# Patient Record
Sex: FEMALE | Race: WHITE | NOT HISPANIC OR LATINO | Employment: FULL TIME | URBAN - METROPOLITAN AREA
[De-identification: names, ages, dates, MRNs, and addresses within clinical notes are randomized per-mention and may not be internally consistent; named-entity substitution may affect disease eponyms.]

---

## 2023-02-20 LAB
CALCIDIOL (25 OH VITAMIN D3) (NG/ML) IN SER/PLAS: 56 NG/ML
CANCER AG 125 (U/ML) IN SERUM: 9.5 U/ML (ref 0–30.2)
THYROTROPIN (MIU/L) IN SER/PLAS BY DETECTION LIMIT <= 0.05 MIU/L: 1.58 MIU/L (ref 0.44–3.98)

## 2023-04-03 ENCOUNTER — TELEPHONE (OUTPATIENT)
Dept: PRIMARY CARE | Facility: CLINIC | Age: 63
End: 2023-04-03
Payer: COMMERCIAL

## 2023-08-04 DIAGNOSIS — Z13.220 LIPID SCREENING: ICD-10-CM

## 2023-08-04 RX ORDER — ATORVASTATIN CALCIUM 10 MG/1
10 TABLET, FILM COATED ORAL DAILY
COMMUNITY
Start: 2023-05-22 | End: 2023-08-04 | Stop reason: SDUPTHER

## 2023-08-04 RX ORDER — ATORVASTATIN CALCIUM 10 MG/1
10 TABLET, FILM COATED ORAL DAILY
Qty: 90 TABLET | Refills: 3 | Status: SHIPPED | OUTPATIENT
Start: 2023-08-04

## 2023-08-30 ENCOUNTER — OFFICE VISIT (OUTPATIENT)
Dept: PRIMARY CARE | Facility: CLINIC | Age: 63
End: 2023-08-30
Payer: COMMERCIAL

## 2023-08-30 VITALS
TEMPERATURE: 97.7 F | DIASTOLIC BLOOD PRESSURE: 74 MMHG | BODY MASS INDEX: 42.69 KG/M2 | WEIGHT: 272 LBS | SYSTOLIC BLOOD PRESSURE: 124 MMHG | HEIGHT: 67 IN | OXYGEN SATURATION: 98 % | HEART RATE: 78 BPM

## 2023-08-30 DIAGNOSIS — E78.5 HYPERLIPIDEMIA, UNSPECIFIED HYPERLIPIDEMIA TYPE: Primary | ICD-10-CM

## 2023-08-30 DIAGNOSIS — E03.9 HYPOTHYROIDISM, UNSPECIFIED TYPE: ICD-10-CM

## 2023-08-30 DIAGNOSIS — C56.9 MALIGNANT NEOPLASM OF OVARY, UNSPECIFIED LATERALITY (MULTI): ICD-10-CM

## 2023-08-30 PROCEDURE — 1036F TOBACCO NON-USER: CPT | Performed by: INTERNAL MEDICINE

## 2023-08-30 PROCEDURE — 99214 OFFICE O/P EST MOD 30 MIN: CPT | Performed by: INTERNAL MEDICINE

## 2023-08-30 RX ORDER — LEVOTHYROXINE SODIUM 150 UG/1
150 TABLET ORAL DAILY
COMMUNITY
End: 2024-03-21 | Stop reason: SDUPTHER

## 2023-08-30 RX ORDER — MELOXICAM 15 MG/1
15 TABLET ORAL DAILY
COMMUNITY
End: 2024-03-21 | Stop reason: SDUPTHER

## 2023-08-30 RX ORDER — CALCIUM CARBONATE 600 MG
600 TABLET ORAL DAILY
COMMUNITY

## 2023-08-30 RX ORDER — CETIRIZINE HYDROCHLORIDE 10 MG/1
5 TABLET ORAL DAILY
COMMUNITY
Start: 2015-07-30

## 2023-08-30 RX ORDER — CALCIUM CARB/VITAMIN D3/VIT K1 500-500-40
400 TABLET,CHEWABLE ORAL
COMMUNITY

## 2023-08-30 RX ORDER — UBIQUINOL 100 MG
CAPSULE ORAL
COMMUNITY
Start: 2015-07-30

## 2023-08-30 NOTE — PROGRESS NOTES
Subjective   Patient ID: Irena Long is a 63 y.o. female who presents for the following    PHYSICAL 23  Assessment/Plan   OA: patient has severe left knee OA and takes meloxicam.       Right knee OA s/p steroid injection lasted 4-5 months.   handicap place card given      hld: started on statin a few weeks ago. will repeat lipid panel and cmp in 6 months      hypothyroid: tsh wnl    Ovarian cancer: likely in remission been 20 years ago now as of 23. Will continue to screen ca-125      ekg wnl     colonoscopy 2021      mammogram order given        HPI  female with hypothyroidism, osteoarthritis of the knees, seasonal allergies, TERRI/BSO ovarian cancer, cholecystectomy, , here for a        positive cologaurd and then patient did do a colonoscopy on 2021 with polyps and was told 5 year follow up     ovarian ca  survivor: patient follows with Dr Zamarripa, she gets repeat CA-125     hypothyroid: patient denies any hypo or hypothyroid symptoms. patient denies any palpitations, diarrhea or constipation    OA right and left knee: feels cold in the knees and it huts. She says that she has difficulties walking at times. She says that steroid injections have historically helped.      social: patient denies any smoking, drug (occasional etoh use) or alcohol abuse     family history: dad passed MI     surgical history: s/p total hysterectomy      works in [id number]     There were no vitals taken for this visit.  PHYSICAL EXAM     General appearance: Alert and in no acute distress. speech is clear and coherent  HEENT: Sclera and conjunctiva white, EOMI,    Respiratory : No respiratory distress, normal respiratory rhythm and effort. Clear bilateral breath sounds. No wheezing or rhonchi.   Cardiovascular: heart rate regular, S1, S2. no murmurs. no Lower extremity edema  Skin inspection: Normal skin color and pigmentation, normal skin turgor and no visible rash, induration, or cellulitis  MSK:  5/5 strength upper and lower extremities without gait abnormalities. no loss of muscle mass   Neuro: 2-12 CN grossly intact.  no slurred speech. no lateralizing deficit  Psychiatric Orientation: Oriented to person, place, and time. no depression, homicidal or suicidal thoughts, normal affect     REVIEW OF SYSTEMS   Constitutional: not feeling tired and no fever, chills or sweats. Denies weight loss   no headache  Cardiovascular: no exertional chest pain, no palpitations, no lower extremity edema    Lungs: Denies shortness of breath, exertional dyspnea, wheezing  Gastrointestinal: no change in bowel habits, no diarrhea, no nausea, no vomiting    Psychiatric: no depression and no anxiety.   Urine: denies polyuria, hematuria, dysuria        No Known Allergies    Current Outpatient Medications   Medication Sig Dispense Refill    atorvastatin (Lipitor) 10 mg tablet Take 1 tablet (10 mg) by mouth once daily. 90 tablet 3     No current facility-administered medications for this visit.       Objective     No visits with results within 4 Month(s) from this visit.   Latest known visit with results is:   Orders Only on 02/20/2023   Component Date Value Ref Range Status    Cancer  02/20/2023 9.5  0.0 - 30.2 U/mL Final       Radiology: Reviewed imaging in powerchart.  No results found.    No family history on file.  Social History     Socioeconomic History    Marital status:      Spouse name: Not on file    Number of children: Not on file    Years of education: Not on file    Highest education level: Not on file   Occupational History    Not on file   Tobacco Use    Smoking status: Not on file    Smokeless tobacco: Not on file   Substance and Sexual Activity    Alcohol use: Not on file    Drug use: Not on file    Sexual activity: Not on file   Other Topics Concern    Not on file   Social History Narrative    Not on file     Social Determinants of Health     Financial Resource Strain: Not on file   Food Insecurity:  Not on file   Transportation Needs: Not on file   Physical Activity: Not on file   Stress: Not on file   Social Connections: Not on file   Intimate Partner Violence: Not on file   Housing Stability: Not on file     Past Medical History:   Diagnosis Date    Allergy status to unspecified drugs, medicaments and biological substances     Atopy    Personal history of malignant neoplasm of ovary     History of ovarian cancer    Personal history of malignant neoplasm of ovary     H/O ovarian cancer    Personal history of other diseases of the musculoskeletal system and connective tissue     History of osteoarthritis    Personal history of other endocrine, nutritional and metabolic disease     History of hypothyroidism     Past Surgical History:   Procedure Laterality Date     SECTION, CLASSIC  2015     Section    CHOLECYSTECTOMY  2015    Cholecystectomy    HYSTERECTOMY  2015    Hysterectomy       Charting was completed using voice recognition technology and may include unintended errors.

## 2023-09-07 ENCOUNTER — LAB (OUTPATIENT)
Dept: LAB | Facility: LAB | Age: 63
End: 2023-09-07
Payer: COMMERCIAL

## 2023-09-07 DIAGNOSIS — E03.9 HYPOTHYROIDISM, UNSPECIFIED TYPE: ICD-10-CM

## 2023-09-07 DIAGNOSIS — E78.5 HYPERLIPIDEMIA, UNSPECIFIED HYPERLIPIDEMIA TYPE: ICD-10-CM

## 2023-09-07 DIAGNOSIS — C56.9 MALIGNANT NEOPLASM OF OVARY, UNSPECIFIED LATERALITY (MULTI): ICD-10-CM

## 2023-09-07 LAB
ALANINE AMINOTRANSFERASE (SGPT) (U/L) IN SER/PLAS: 12 U/L (ref 7–45)
ALBUMIN (G/DL) IN SER/PLAS: 4.3 G/DL (ref 3.4–5)
ALKALINE PHOSPHATASE (U/L) IN SER/PLAS: 81 U/L (ref 33–136)
ANION GAP IN SER/PLAS: 19 MMOL/L (ref 10–20)
ASPARTATE AMINOTRANSFERASE (SGOT) (U/L) IN SER/PLAS: 15 U/L (ref 9–39)
BILIRUBIN TOTAL (MG/DL) IN SER/PLAS: 0.5 MG/DL (ref 0–1.2)
CALCIUM (MG/DL) IN SER/PLAS: 10 MG/DL (ref 8.6–10.6)
CANCER AG 125 (U/ML) IN SERUM: 10 U/ML (ref 0–30.2)
CARBON DIOXIDE, TOTAL (MMOL/L) IN SER/PLAS: 25 MMOL/L (ref 21–32)
CHLORIDE (MMOL/L) IN SER/PLAS: 104 MMOL/L (ref 98–107)
CHOLESTEROL (MG/DL) IN SER/PLAS: 188 MG/DL (ref 0–199)
CHOLESTEROL IN HDL (MG/DL) IN SER/PLAS: 61.2 MG/DL
CHOLESTEROL/HDL RATIO: 3.1
CREATININE (MG/DL) IN SER/PLAS: 0.79 MG/DL (ref 0.5–1.05)
GFR FEMALE: 84 ML/MIN/1.73M2
GLUCOSE (MG/DL) IN SER/PLAS: 91 MG/DL (ref 74–99)
LDL: 105 MG/DL (ref 0–99)
POTASSIUM (MMOL/L) IN SER/PLAS: 4.9 MMOL/L (ref 3.5–5.3)
PROTEIN TOTAL: 6.5 G/DL (ref 6.4–8.2)
SODIUM (MMOL/L) IN SER/PLAS: 143 MMOL/L (ref 136–145)
THYROTROPIN (MIU/L) IN SER/PLAS BY DETECTION LIMIT <= 0.05 MIU/L: 1.54 MIU/L (ref 0.44–3.98)
TRIGLYCERIDE (MG/DL) IN SER/PLAS: 108 MG/DL (ref 0–149)
UREA NITROGEN (MG/DL) IN SER/PLAS: 17 MG/DL (ref 6–23)
VLDL: 22 MG/DL (ref 0–40)

## 2023-09-07 PROCEDURE — 84443 ASSAY THYROID STIM HORMONE: CPT

## 2023-09-07 PROCEDURE — 80053 COMPREHEN METABOLIC PANEL: CPT

## 2023-09-07 PROCEDURE — 36415 COLL VENOUS BLD VENIPUNCTURE: CPT

## 2023-09-07 PROCEDURE — 80061 LIPID PANEL: CPT

## 2023-09-07 PROCEDURE — 86304 IMMUNOASSAY TUMOR CA 125: CPT

## 2023-10-09 ENCOUNTER — OFFICE VISIT (OUTPATIENT)
Dept: PRIMARY CARE | Facility: CLINIC | Age: 63
End: 2023-10-09
Payer: COMMERCIAL

## 2023-10-09 VITALS
OXYGEN SATURATION: 99 % | DIASTOLIC BLOOD PRESSURE: 80 MMHG | BODY MASS INDEX: 41.59 KG/M2 | SYSTOLIC BLOOD PRESSURE: 118 MMHG | WEIGHT: 265 LBS | HEART RATE: 66 BPM | TEMPERATURE: 97.7 F | HEIGHT: 67 IN

## 2023-10-09 DIAGNOSIS — M25.562 CHRONIC PAIN OF BOTH KNEES: Primary | ICD-10-CM

## 2023-10-09 DIAGNOSIS — G89.29 CHRONIC PAIN OF BOTH KNEES: Primary | ICD-10-CM

## 2023-10-09 DIAGNOSIS — E78.5 HYPERLIPIDEMIA, UNSPECIFIED HYPERLIPIDEMIA TYPE: ICD-10-CM

## 2023-10-09 DIAGNOSIS — M25.561 CHRONIC PAIN OF BOTH KNEES: Primary | ICD-10-CM

## 2023-10-09 PROCEDURE — 1036F TOBACCO NON-USER: CPT | Performed by: INTERNAL MEDICINE

## 2023-10-09 PROCEDURE — 99213 OFFICE O/P EST LOW 20 MIN: CPT | Performed by: INTERNAL MEDICINE

## 2023-10-09 PROCEDURE — 20610 DRAIN/INJ JOINT/BURSA W/O US: CPT | Performed by: INTERNAL MEDICINE

## 2023-10-09 RX ORDER — METHYLPREDNISOLONE 4 MG/1
TABLET ORAL
Qty: 21 TABLET | Refills: 0 | Status: SHIPPED | OUTPATIENT
Start: 2023-10-09 | End: 2023-10-16

## 2023-10-09 RX ORDER — TRIAMCINOLONE ACETONIDE 40 MG/ML
80 INJECTION, SUSPENSION INTRA-ARTICULAR; INTRAMUSCULAR ONCE
Status: COMPLETED | OUTPATIENT
Start: 2023-10-09 | End: 2023-10-09

## 2023-10-09 RX ADMIN — TRIAMCINOLONE ACETONIDE 80 MG: 40 INJECTION, SUSPENSION INTRA-ARTICULAR; INTRAMUSCULAR at 09:30

## 2023-10-09 RX ADMIN — TRIAMCINOLONE ACETONIDE 80 MG: 40 INJECTION, SUSPENSION INTRA-ARTICULAR; INTRAMUSCULAR at 09:35

## 2024-01-05 ENCOUNTER — TELEPHONE (OUTPATIENT)
Dept: PRIMARY CARE | Facility: CLINIC | Age: 64
End: 2024-01-05
Payer: COMMERCIAL

## 2024-01-05 NOTE — TELEPHONE ENCOUNTER
Pt is requesting a letter to excuse her from work on days when weather is bad so she doesn't risk falling, stated it is due to knee issues. Her job wants her coming into work twice a week. Please advise. Next apt is in February.

## 2024-01-09 NOTE — TELEPHONE ENCOUNTER
"Lmtcb.   Per Dr. Borja \" refer pt to Dr Lorenz for knee surgery recommendations. No letter for now as pain should be controlled through steroid inj, oral meds or knee implant.\"  "

## 2024-02-19 ENCOUNTER — APPOINTMENT (OUTPATIENT)
Dept: PRIMARY CARE | Facility: CLINIC | Age: 64
End: 2024-02-19
Payer: COMMERCIAL

## 2024-02-21 ENCOUNTER — LAB (OUTPATIENT)
Dept: LAB | Facility: LAB | Age: 64
End: 2024-02-21
Payer: COMMERCIAL

## 2024-02-21 ENCOUNTER — OFFICE VISIT (OUTPATIENT)
Dept: PRIMARY CARE | Facility: CLINIC | Age: 64
End: 2024-02-21
Payer: COMMERCIAL

## 2024-02-21 VITALS
WEIGHT: 265 LBS | DIASTOLIC BLOOD PRESSURE: 87 MMHG | BODY MASS INDEX: 41.59 KG/M2 | HEART RATE: 71 BPM | HEIGHT: 67 IN | SYSTOLIC BLOOD PRESSURE: 149 MMHG | OXYGEN SATURATION: 100 %

## 2024-02-21 DIAGNOSIS — Z00.00 ANNUAL PHYSICAL EXAM: ICD-10-CM

## 2024-02-21 DIAGNOSIS — Z00.00 ANNUAL PHYSICAL EXAM: Primary | ICD-10-CM

## 2024-02-21 LAB
25(OH)D3 SERPL-MCNC: 62 NG/ML (ref 30–100)
ALBUMIN SERPL BCP-MCNC: 4.3 G/DL (ref 3.4–5)
ALP SERPL-CCNC: 80 U/L (ref 33–136)
ALT SERPL W P-5'-P-CCNC: 21 U/L (ref 7–45)
ANION GAP SERPL CALC-SCNC: 11 MMOL/L (ref 10–20)
AST SERPL W P-5'-P-CCNC: 20 U/L (ref 9–39)
BILIRUB SERPL-MCNC: 0.6 MG/DL (ref 0–1.2)
BUN SERPL-MCNC: 23 MG/DL (ref 6–23)
CALCIUM SERPL-MCNC: 9.6 MG/DL (ref 8.6–10.6)
CANCER AG125 SERPL-ACNC: 10 U/ML (ref 0–30.2)
CHLORIDE SERPL-SCNC: 106 MMOL/L (ref 98–107)
CHOLEST SERPL-MCNC: 190 MG/DL (ref 0–199)
CHOLESTEROL/HDL RATIO: 3
CO2 SERPL-SCNC: 29 MMOL/L (ref 21–32)
CREAT SERPL-MCNC: 0.73 MG/DL (ref 0.5–1.05)
EGFRCR SERPLBLD CKD-EPI 2021: >90 ML/MIN/1.73M*2
ERYTHROCYTE [DISTWIDTH] IN BLOOD BY AUTOMATED COUNT: 12.6 % (ref 11.5–14.5)
EST. AVERAGE GLUCOSE BLD GHB EST-MCNC: 94 MG/DL
GLUCOSE SERPL-MCNC: 96 MG/DL (ref 74–99)
HBA1C MFR BLD: 4.9 %
HCT VFR BLD AUTO: 44.6 % (ref 36–46)
HDLC SERPL-MCNC: 63.1 MG/DL
HGB BLD-MCNC: 14.4 G/DL (ref 12–16)
LDLC SERPL CALC-MCNC: 105 MG/DL
MCH RBC QN AUTO: 30.1 PG (ref 26–34)
MCHC RBC AUTO-ENTMCNC: 32.3 G/DL (ref 32–36)
MCV RBC AUTO: 93 FL (ref 80–100)
NON HDL CHOLESTEROL: 127 MG/DL (ref 0–149)
NRBC BLD-RTO: 0 /100 WBCS (ref 0–0)
PLATELET # BLD AUTO: 227 X10*3/UL (ref 150–450)
POTASSIUM SERPL-SCNC: 4.6 MMOL/L (ref 3.5–5.3)
PROT SERPL-MCNC: 6.4 G/DL (ref 6.4–8.2)
RBC # BLD AUTO: 4.79 X10*6/UL (ref 4–5.2)
SODIUM SERPL-SCNC: 141 MMOL/L (ref 136–145)
TRIGL SERPL-MCNC: 110 MG/DL (ref 0–149)
TSH SERPL-ACNC: 0.92 MIU/L (ref 0.44–3.98)
VLDL: 22 MG/DL (ref 0–40)
WBC # BLD AUTO: 7.2 X10*3/UL (ref 4.4–11.3)

## 2024-02-21 PROCEDURE — 86304 IMMUNOASSAY TUMOR CA 125: CPT

## 2024-02-21 PROCEDURE — 80061 LIPID PANEL: CPT

## 2024-02-21 PROCEDURE — 85027 COMPLETE CBC AUTOMATED: CPT

## 2024-02-21 PROCEDURE — 93000 ELECTROCARDIOGRAM COMPLETE: CPT | Performed by: INTERNAL MEDICINE

## 2024-02-21 PROCEDURE — 99396 PREV VISIT EST AGE 40-64: CPT | Performed by: INTERNAL MEDICINE

## 2024-02-21 PROCEDURE — 82306 VITAMIN D 25 HYDROXY: CPT

## 2024-02-21 PROCEDURE — 1036F TOBACCO NON-USER: CPT | Performed by: INTERNAL MEDICINE

## 2024-02-21 PROCEDURE — 80053 COMPREHEN METABOLIC PANEL: CPT

## 2024-02-21 PROCEDURE — 36415 COLL VENOUS BLD VENIPUNCTURE: CPT

## 2024-02-21 PROCEDURE — 84443 ASSAY THYROID STIM HORMONE: CPT

## 2024-02-21 PROCEDURE — 83036 HEMOGLOBIN GLYCOSYLATED A1C: CPT

## 2024-02-21 NOTE — PROGRESS NOTES
"Subjective   Patient ID: Irena Long is a 63 y.o. female who presents for the following    PHYSICAL 24  Assessment/Plan   OA: patient has severe left knee OA and moderate right knee OA and takes meloxicam.     bilateral knee OA s/p steroid injection lasted 4-5 months. Following with dr shen. Possible surgery in the near future.     hld: stable, on atorvastatin       hypothyroid: tsh wnl    Ovarian cancer: likely in remission been 20 years ago now as of 23. Will continue to screen ca-125      ekg wnl      positive cologaurd and then patient did do a colonoscopy on 2021 with polyps and was told 5 year follow up     mammogram order given        HPI  female with hypothyroidism, osteoarthritis of the knees, seasonal allergies, TERRI/BSO ovarian cancer, cholecystectomy, , here for a            ovarian ca  survivor: patient follows with Dr Zamarripa, she gets repeat CA-125     hypothyroid: patient denies any hypo or hypothyroid symptoms. patient denies any palpitations, diarrhea or constipation    OA right and left knee: feels cold in the knees and it huts. She says that she has difficulties walking at times. She says that steroid injections have historically helped.      social: patient denies any smoking, drug (occasional etoh use) or alcohol abuse     family history: dad passed MI     surgical history: s/p total hysterectomy      works in [employer]      Visit Vitals  /87 (BP Location: Left arm, Patient Position: Sitting, BP Cuff Size: Large adult)   Pulse 71   Ht 1.702 m (5' 7\")   Wt 120 kg (265 lb)   SpO2 100%   BMI 41.50 kg/m²   Smoking Status Former   BSA 2.38 m²     PHYSICAL EXAM     General appearance: Alert and in no acute distress. speech is clear and coherent  HEENT: Sclera and conjunctiva white, EOMI, uvela midline, no mouth lesions. PERRLA,  nasal turbinates are not swollen without exudate. TM's Jerome with cone of light, external ear canal with scant cerumen. No head " trauma  Neck: no carotid bruits or thyromegaly. no lymphadenopathy   Respiratory : No respiratory distress, normal respiratory rhythm and effort. Clear bilateral breath sounds. No wheezing or rhonchi.   Cardiovascular: heart rate regular, S1, S2. no murmurs. no Lower extremity edema  Skin inspection: Normal skin color and pigmentation, normal skin turgor and no visible rash, induration, or cellulitis  MSK: 5/5 strength upper and lower extremities without gait abnormalities. no loss of muscle mass   Neuro: 2-12 CN grossly intact.  no slurred speech. no lateralizing deficit  Psychiatric Orientation: Oriented to person, place, and time. no depression, homicidal or suicidal thoughts, normal affect  Abdomen: soft, none tender, none distended. no organomegaly  [id number]    REVIEW OF SYSTEMS   Constitutional: not feeling tired and no fever, chills or sweats. Denies weight loss   no headache  Cardiovascular: no exertional chest pain, no palpitations, no lower extremity edema    Lungs: Denies shortness of breath, exertional dyspnea, wheezing  Gastrointestinal: no change in bowel habits, no diarrhea, no nausea, no vomiting    Psychiatric: no depression and no anxiety.   Urine: denies polyuria, hematuria, dysuria    MSK: no lower extremity edema. No knee effusions.     Allergies   Allergen Reactions    Percocet [Oxycodone-Acetaminophen] Hallucinations       Current Outpatient Medications   Medication Sig Dispense Refill    atorvastatin (Lipitor) 10 mg tablet Take 1 tablet (10 mg) by mouth once daily. 90 tablet 3    calcium carbonate 600 mg calcium (1,500 mg) tablet Take 1 tablet (600 mg) by mouth once daily.      cetirizine (ZyrTEC) 10 mg tablet Take 0.5 tablets (5 mg) by mouth once daily.      cholecalciferol, vitamin D3, 10 mcg (400 unit) capsule Take 1 capsule (10 mcg) by mouth.      COQ10, LIPOSOMAL UBIQUINOL, ORAL Take 400 mg by mouth early in the morning..      levothyroxine (Synthroid, Levoxyl) 150 mcg tablet Take 1 tablet  (150 mcg) by mouth once daily.      meloxicam (Mobic) 15 mg tablet Take 1 tablet (15 mg) by mouth once daily.      multivit-min-iron-FA-vit K-lut 8 mg iron-400 mcg-50 mcg tablet Take by mouth.      glucosamine HCl 750 mg tablet Take by mouth.       No current facility-administered medications for this visit.       Objective     No visits with results within 4 Month(s) from this visit.   Latest known visit with results is:   Lab on 09/07/2023   Component Date Value Ref Range Status    Glucose 09/07/2023 91  74 - 99 mg/dL Final    Sodium 09/07/2023 143  136 - 145 mmol/L Final    Potassium 09/07/2023 4.9  3.5 - 5.3 mmol/L Final    Chloride 09/07/2023 104  98 - 107 mmol/L Final    Bicarbonate 09/07/2023 25  21 - 32 mmol/L Final    Anion Gap 09/07/2023 19  10 - 20 mmol/L Final    Urea Nitrogen 09/07/2023 17  6 - 23 mg/dL Final    Creatinine 09/07/2023 0.79  0.50 - 1.05 mg/dL Final    GFR Female 09/07/2023 84  >90 mL/min/1.73m2 Final    Calcium 09/07/2023 10.0  8.6 - 10.6 mg/dL Final    Albumin 09/07/2023 4.3  3.4 - 5.0 g/dL Final    Alkaline Phosphatase 09/07/2023 81  33 - 136 U/L Final    Total Protein 09/07/2023 6.5  6.4 - 8.2 g/dL Final    AST 09/07/2023 15  9 - 39 U/L Final    Total Bilirubin 09/07/2023 0.5  0.0 - 1.2 mg/dL Final    ALT (SGPT) 09/07/2023 12  7 - 45 U/L Final    Cholesterol 09/07/2023 188  0 - 199 mg/dL Final    HDL 09/07/2023 61.2  mg/dL Final    Cholesterol/HDL Ratio 09/07/2023 3.1   Final    LDL 09/07/2023 105 (H)  0 - 99 mg/dL Final    VLDL 09/07/2023 22  0 - 40 mg/dL Final    Triglycerides 09/07/2023 108  0 - 149 mg/dL Final    TSH 09/07/2023 1.54  0.44 - 3.98 mIU/L Final    Cancer  09/07/2023 10.0  0.0 - 30.2 U/mL Final       Radiology: Reviewed imaging in powerchart.  No results found.    No family history on file.  Social History     Socioeconomic History    Marital status:      Spouse name: None    Number of children: None    Years of education: None    Highest education level:  None   Occupational History    None   Tobacco Use    Smoking status: Former     Types: Cigarettes     Quit date:      Years since quittin.1    Smokeless tobacco: Never   Substance and Sexual Activity    Alcohol use: Yes     Comment: on holidays    Drug use: Never    Sexual activity: None   Other Topics Concern    None   Social History Narrative    None     Social Determinants of Health     Financial Resource Strain: Not on file   Food Insecurity: Not on file   Transportation Needs: Not on file   Physical Activity: Not on file   Stress: Not on file   Social Connections: Not on file   Intimate Partner Violence: Not on file   Housing Stability: Not on file     Past Medical History:   Diagnosis Date    Allergy status to unspecified drugs, medicaments and biological substances     Atopy    Personal history of malignant neoplasm of ovary     History of ovarian cancer    Personal history of malignant neoplasm of ovary     H/O ovarian cancer    Personal history of other diseases of the musculoskeletal system and connective tissue     History of osteoarthritis    Personal history of other endocrine, nutritional and metabolic disease     History of hypothyroidism     Past Surgical History:   Procedure Laterality Date     SECTION, CLASSIC  2015     Section    CHOLECYSTECTOMY  2015    Cholecystectomy    HYSTERECTOMY  2015    Hysterectomy       Charting was completed using voice recognition technology and may include unintended errors.

## 2024-03-21 DIAGNOSIS — M17.0 OSTEOARTHRITIS OF BOTH KNEES, UNSPECIFIED OSTEOARTHRITIS TYPE: ICD-10-CM

## 2024-03-21 DIAGNOSIS — E03.9 HYPOTHYROIDISM, UNSPECIFIED TYPE: ICD-10-CM

## 2024-03-22 RX ORDER — MELOXICAM 15 MG/1
15 TABLET ORAL DAILY
Qty: 90 TABLET | Refills: 3 | Status: SHIPPED | OUTPATIENT
Start: 2024-03-22

## 2024-03-22 RX ORDER — LEVOTHYROXINE SODIUM 150 UG/1
150 TABLET ORAL DAILY
Qty: 90 TABLET | Refills: 3 | Status: SHIPPED | OUTPATIENT
Start: 2024-03-22

## 2024-08-12 ENCOUNTER — LAB (OUTPATIENT)
Dept: LAB | Facility: LAB | Age: 64
End: 2024-08-12
Payer: COMMERCIAL

## 2024-08-12 ENCOUNTER — APPOINTMENT (OUTPATIENT)
Dept: PRIMARY CARE | Facility: CLINIC | Age: 64
End: 2024-08-12
Payer: COMMERCIAL

## 2024-08-12 VITALS
SYSTOLIC BLOOD PRESSURE: 159 MMHG | BODY MASS INDEX: 40.81 KG/M2 | HEIGHT: 67 IN | HEART RATE: 59 BPM | WEIGHT: 260 LBS | OXYGEN SATURATION: 96 % | DIASTOLIC BLOOD PRESSURE: 87 MMHG

## 2024-08-12 DIAGNOSIS — E78.5 HYPERLIPIDEMIA, UNSPECIFIED HYPERLIPIDEMIA TYPE: ICD-10-CM

## 2024-08-12 DIAGNOSIS — Z13.220 LIPID SCREENING: ICD-10-CM

## 2024-08-12 DIAGNOSIS — E03.9 HYPOTHYROIDISM, UNSPECIFIED TYPE: ICD-10-CM

## 2024-08-12 DIAGNOSIS — C56.9 MALIGNANT NEOPLASM OF OVARY, UNSPECIFIED LATERALITY (MULTI): ICD-10-CM

## 2024-08-12 DIAGNOSIS — E78.5 HYPERLIPIDEMIA, UNSPECIFIED HYPERLIPIDEMIA TYPE: Primary | ICD-10-CM

## 2024-08-12 LAB
CANCER AG125 SERPL-ACNC: 9.8 U/ML (ref 0–30.2)
CHOLEST SERPL-MCNC: 180 MG/DL (ref 0–199)
CHOLESTEROL/HDL RATIO: 2.6
HDLC SERPL-MCNC: 68.6 MG/DL
LDLC SERPL CALC-MCNC: 97 MG/DL
NON HDL CHOLESTEROL: 111 MG/DL (ref 0–149)
TRIGL SERPL-MCNC: 70 MG/DL (ref 0–149)
TSH SERPL-ACNC: 0.89 MIU/L (ref 0.44–3.98)
VLDL: 14 MG/DL (ref 0–40)

## 2024-08-12 PROCEDURE — 1036F TOBACCO NON-USER: CPT | Performed by: INTERNAL MEDICINE

## 2024-08-12 PROCEDURE — 3008F BODY MASS INDEX DOCD: CPT | Performed by: INTERNAL MEDICINE

## 2024-08-12 PROCEDURE — 84443 ASSAY THYROID STIM HORMONE: CPT

## 2024-08-12 PROCEDURE — 80061 LIPID PANEL: CPT

## 2024-08-12 PROCEDURE — 36415 COLL VENOUS BLD VENIPUNCTURE: CPT

## 2024-08-12 PROCEDURE — 86304 IMMUNOASSAY TUMOR CA 125: CPT

## 2024-08-12 PROCEDURE — 99214 OFFICE O/P EST MOD 30 MIN: CPT | Performed by: INTERNAL MEDICINE

## 2024-08-12 RX ORDER — ATORVASTATIN CALCIUM 10 MG/1
10 TABLET, FILM COATED ORAL DAILY
Qty: 90 TABLET | Refills: 3 | Status: SHIPPED | OUTPATIENT
Start: 2024-08-12

## 2024-08-12 NOTE — PROGRESS NOTES
"Subjective   Patient ID: Irena Long is a 64 y.o. female who presents for the following  Chronic disease follow up    PHYSICAL 24  Assessment/Plan   OA: patient has severe left knee OA and moderate right knee OA and takes meloxicam.     bilateral knee OA s/p steroid injection lasted 4-5 months. Following with dr shen. Possible surgery in the near future.     hld: stable, on atorvastatin       hypothyroid: tsh wnl    Hx Ovarian cancer: likely in remission been 21 years ago now as of 24. Will continue to screen ca-125      ekg wnl      positive cologaurd and then patient did do a colonoscopy on 2021 with polyps and was told 5 year follow up     mammogram done       HPI  female with hypothyroidism, osteoarthritis of the knees, seasonal allergies, TERRI/BSO ovarian cancer, cholecystectomy, , here for a        ovarian ca  survivor: patient followed with Dr Zamarripa (retired), she gets repeat CA-125     hypothyroid: patient denies any hypo or hypothyroid symptoms. patient denies any palpitations, diarrhea or constipation    OA right and left knee: feels cold in the knees and it huts. She says that she has difficulties walking at times. She says that steroid injections have historically helped.      social: patient denies any smoking, drug (occasional etoh use) or alcohol abuse     family history: dad passed MI     surgical history: s/p total hysterectomy      works in [employer]      Visit Vitals  /87 (BP Location: Right arm, Patient Position: Sitting, BP Cuff Size: Large adult)   Pulse 59   Ht 1.702 m (5' 7\")   Wt 118 kg (260 lb)   SpO2 96%   BMI 40.72 kg/m²   Smoking Status Former   BSA 2.36 m²     PHYSICAL EXAM     General appearance: Alert and in no acute distress. speech is clear and coherent  HEENT: Sclera and conjunctiva white, EOMI  No head trauma    Respiratory : No respiratory distress, normal respiratory rhythm and effort. Clear bilateral breath sounds. No wheezing or rhonchi. "   Cardiovascular: heart rate regular, S1, S2. no murmurs. no Lower extremity edema  Skin inspection: Normal skin color and pigmentation, normal skin turgor and no visible rash, induration, or cellulitis  MSK: 5/5 strength upper and lower extremities without gait abnormalities. no loss of muscle mass   Neuro: 2-12 CN grossly intact.  no slurred speech. no lateralizing deficit  Psychiatric Orientation: Oriented to person, place, and time. no depression, homicidal or suicidal thoughts, normal affect        REVIEW OF SYSTEMS   Constitutional: not feeling tired and no fever, chills or sweats. Denies weight loss   no headache  Cardiovascular: no exertional chest pain, no palpitations, no lower extremity edema    Lungs: Denies shortness of breath, exertional dyspnea, wheezing  Gastrointestinal: no change in bowel habits, no diarrhea, no nausea, no vomiting    Psychiatric: no depression and no anxiety.   Urine: denies polyuria, hematuria, dysuria    MSK: no lower extremity edema. No knee effusions.     Allergies   Allergen Reactions    Percocet [Oxycodone-Acetaminophen] Hallucinations       Current Outpatient Medications   Medication Sig Dispense Refill    atorvastatin (Lipitor) 10 mg tablet Take 1 tablet (10 mg) by mouth once daily. 90 tablet 3    calcium carbonate 600 mg calcium (1,500 mg) tablet Take 1 tablet (600 mg) by mouth once daily.      cetirizine (ZyrTEC) 10 mg tablet Take 0.5 tablets (5 mg) by mouth once daily.      cholecalciferol, vitamin D3, 10 mcg (400 unit) capsule Take 1 capsule (10 mcg) by mouth.      COQ10, LIPOSOMAL UBIQUINOL, ORAL Take 400 mg by mouth early in the morning..      levothyroxine (Synthroid, Levoxyl) 150 mcg tablet Take 1 tablet (150 mcg) by mouth once daily. 90 tablet 3    meloxicam (Mobic) 15 mg tablet Take 1 tablet (15 mg) by mouth once daily. 90 tablet 3    multivit-min-iron-FA-vit K-lut 8 mg iron-400 mcg-50 mcg tablet Take by mouth.      glucosamine HCl 750 mg tablet Take by mouth.        No current facility-administered medications for this visit.       Objective     No visits with results within 4 Month(s) from this visit.   Latest known visit with results is:   Lab on 02/21/2024   Component Date Value Ref Range Status    WBC 02/21/2024 7.2  4.4 - 11.3 x10*3/uL Final    nRBC 02/21/2024 0.0  0.0 - 0.0 /100 WBCs Final    RBC 02/21/2024 4.79  4.00 - 5.20 x10*6/uL Final    Hemoglobin 02/21/2024 14.4  12.0 - 16.0 g/dL Final    Hematocrit 02/21/2024 44.6  36.0 - 46.0 % Final    MCV 02/21/2024 93  80 - 100 fL Final    MCH 02/21/2024 30.1  26.0 - 34.0 pg Final    MCHC 02/21/2024 32.3  32.0 - 36.0 g/dL Final    RDW 02/21/2024 12.6  11.5 - 14.5 % Final    Platelets 02/21/2024 227  150 - 450 x10*3/uL Final    Glucose 02/21/2024 96  74 - 99 mg/dL Final    Sodium 02/21/2024 141  136 - 145 mmol/L Final    Potassium 02/21/2024 4.6  3.5 - 5.3 mmol/L Final    Chloride 02/21/2024 106  98 - 107 mmol/L Final    Bicarbonate 02/21/2024 29  21 - 32 mmol/L Final    Anion Gap 02/21/2024 11  10 - 20 mmol/L Final    Urea Nitrogen 02/21/2024 23  6 - 23 mg/dL Final    Creatinine 02/21/2024 0.73  0.50 - 1.05 mg/dL Final    eGFR 02/21/2024 >90  >60 mL/min/1.73m*2 Final    Calcium 02/21/2024 9.6  8.6 - 10.6 mg/dL Final    Albumin 02/21/2024 4.3  3.4 - 5.0 g/dL Final    Alkaline Phosphatase 02/21/2024 80  33 - 136 U/L Final    Total Protein 02/21/2024 6.4  6.4 - 8.2 g/dL Final    AST 02/21/2024 20  9 - 39 U/L Final    Bilirubin, Total 02/21/2024 0.6  0.0 - 1.2 mg/dL Final    ALT 02/21/2024 21  7 - 45 U/L Final    Hemoglobin A1C 02/21/2024 4.9  see below % Final    Estimated Average Glucose 02/21/2024 94  Not Established mg/dL Final    Cholesterol 02/21/2024 190  0 - 199 mg/dL Final    HDL-Cholesterol 02/21/2024 63.1  mg/dL Final    Cholesterol/HDL Ratio 02/21/2024 3.0   Final    LDL Calculated 02/21/2024 105 (H)  <=99 mg/dL Final    VLDL 02/21/2024 22  0 - 40 mg/dL Final    Triglycerides 02/21/2024 110  0 - 149 mg/dL Final     Non HDL Cholesterol 2024 127  0 - 149 mg/dL Final    Thyroid Stimulating Hormone 2024 0.92  0.44 - 3.98 mIU/L Final    Vitamin D, 25-Hydroxy, Total 2024 62  30 - 100 ng/mL Final    Cancer  2024 10.0  0.0 - 30.2 U/mL Final       Radiology: Reviewed imaging in powerchart.  No results found.    No family history on file.  Social History     Socioeconomic History    Marital status:    Tobacco Use    Smoking status: Former     Current packs/day: 0.00     Types: Cigarettes     Quit date:      Years since quittin.6    Smokeless tobacco: Never   Substance and Sexual Activity    Alcohol use: Yes     Alcohol/week: 2.0 - 3.0 standard drinks of alcohol     Types: 2 - 3 Standard drinks or equivalent per week     Comment: on holidays    Drug use: Never     Past Medical History:   Diagnosis Date    Allergy status to unspecified drugs, medicaments and biological substances     Atopy    Personal history of malignant neoplasm of ovary     History of ovarian cancer    Personal history of malignant neoplasm of ovary     H/O ovarian cancer    Personal history of other diseases of the musculoskeletal system and connective tissue     History of osteoarthritis    Personal history of other endocrine, nutritional and metabolic disease     History of hypothyroidism     Past Surgical History:   Procedure Laterality Date     SECTION, CLASSIC  2015     Section    CHOLECYSTECTOMY  2015    Cholecystectomy    HYSTERECTOMY  2015    Hysterectomy       Charting was completed using voice recognition technology and may include unintended errors.

## 2024-08-21 ENCOUNTER — APPOINTMENT (OUTPATIENT)
Dept: PRIMARY CARE | Facility: CLINIC | Age: 64
End: 2024-08-21
Payer: COMMERCIAL

## 2024-12-18 ENCOUNTER — TELEPHONE (OUTPATIENT)
Dept: PRIMARY CARE | Facility: CLINIC | Age: 64
End: 2024-12-18
Payer: COMMERCIAL

## 2024-12-18 NOTE — TELEPHONE ENCOUNTER
Pt would like to know if she could have labs prior to appt. Please call and let her know    Appt was a reschedule

## 2024-12-19 DIAGNOSIS — G89.29 CHRONIC PAIN OF BOTH KNEES: ICD-10-CM

## 2024-12-19 DIAGNOSIS — E03.9 HYPOTHYROIDISM, UNSPECIFIED TYPE: ICD-10-CM

## 2024-12-19 DIAGNOSIS — M25.562 CHRONIC PAIN OF BOTH KNEES: ICD-10-CM

## 2024-12-19 DIAGNOSIS — C56.9 MALIGNANT NEOPLASM OF OVARY, UNSPECIFIED LATERALITY (MULTI): ICD-10-CM

## 2024-12-19 DIAGNOSIS — E78.5 HYPERLIPIDEMIA, UNSPECIFIED HYPERLIPIDEMIA TYPE: Primary | ICD-10-CM

## 2024-12-19 DIAGNOSIS — M25.561 CHRONIC PAIN OF BOTH KNEES: ICD-10-CM

## 2025-02-17 ENCOUNTER — APPOINTMENT (OUTPATIENT)
Dept: PRIMARY CARE | Facility: CLINIC | Age: 65
End: 2025-02-17
Payer: COMMERCIAL

## 2025-02-20 LAB
25(OH)D3+25(OH)D2 SERPL-MCNC: 72 NG/ML (ref 30–100)
ALBUMIN SERPL-MCNC: 4.8 G/DL (ref 3.6–5.1)
ALP SERPL-CCNC: 87 U/L (ref 37–153)
ALT SERPL-CCNC: 14 U/L (ref 6–29)
ANION GAP SERPL CALCULATED.4IONS-SCNC: 12 MMOL/L (CALC) (ref 7–17)
AST SERPL-CCNC: 18 U/L (ref 10–35)
BILIRUB SERPL-MCNC: 0.7 MG/DL (ref 0.2–1.2)
BUN SERPL-MCNC: 24 MG/DL (ref 7–25)
CALCIUM SERPL-MCNC: 9.6 MG/DL (ref 8.6–10.4)
CHLORIDE SERPL-SCNC: 104 MMOL/L (ref 98–110)
CHOLEST SERPL-MCNC: 205 MG/DL
CHOLEST/HDLC SERPL: 2.8 (CALC)
CO2 SERPL-SCNC: 24 MMOL/L (ref 20–32)
CREAT SERPL-MCNC: 0.76 MG/DL (ref 0.5–1.05)
EGFRCR SERPLBLD CKD-EPI 2021: 87 ML/MIN/1.73M2
ERYTHROCYTE [DISTWIDTH] IN BLOOD BY AUTOMATED COUNT: 12.1 % (ref 11–15)
EST. AVERAGE GLUCOSE BLD GHB EST-MCNC: 103 MG/DL
EST. AVERAGE GLUCOSE BLD GHB EST-SCNC: 5.7 MMOL/L
GLUCOSE SERPL-MCNC: 103 MG/DL (ref 65–99)
HBA1C MFR BLD: 5.2 % OF TOTAL HGB
HCT VFR BLD AUTO: 45.6 % (ref 35–45)
HDLC SERPL-MCNC: 73 MG/DL
HGB BLD-MCNC: 15.4 G/DL (ref 11.7–15.5)
LDLC SERPL CALC-MCNC: 111 MG/DL (CALC)
MCH RBC QN AUTO: 30.3 PG (ref 27–33)
MCHC RBC AUTO-ENTMCNC: 33.8 G/DL (ref 32–36)
MCV RBC AUTO: 89.6 FL (ref 80–100)
NONHDLC SERPL-MCNC: 132 MG/DL (CALC)
PLATELET # BLD AUTO: 236 THOUSAND/UL (ref 140–400)
PMV BLD REES-ECKER: 11.2 FL (ref 7.5–12.5)
POTASSIUM SERPL-SCNC: 4.2 MMOL/L (ref 3.5–5.3)
PROT SERPL-MCNC: 6.9 G/DL (ref 6.1–8.1)
RBC # BLD AUTO: 5.09 MILLION/UL (ref 3.8–5.1)
SODIUM SERPL-SCNC: 140 MMOL/L (ref 135–146)
TRIGL SERPL-MCNC: 99 MG/DL
TSH SERPL-ACNC: 2.25 MIU/L (ref 0.4–4.5)
WBC # BLD AUTO: 6.4 THOUSAND/UL (ref 3.8–10.8)

## 2025-02-24 ENCOUNTER — APPOINTMENT (OUTPATIENT)
Dept: PRIMARY CARE | Facility: CLINIC | Age: 65
End: 2025-02-24
Payer: COMMERCIAL

## 2025-02-24 VITALS
HEIGHT: 67 IN | HEART RATE: 76 BPM | BODY MASS INDEX: 41.28 KG/M2 | DIASTOLIC BLOOD PRESSURE: 84 MMHG | OXYGEN SATURATION: 97 % | WEIGHT: 263 LBS | SYSTOLIC BLOOD PRESSURE: 146 MMHG

## 2025-02-24 DIAGNOSIS — E78.49 OTHER HYPERLIPIDEMIA: ICD-10-CM

## 2025-02-24 DIAGNOSIS — E03.9 HYPOTHYROIDISM, UNSPECIFIED TYPE: ICD-10-CM

## 2025-02-24 DIAGNOSIS — Z00.00 ANNUAL PHYSICAL EXAM: Primary | ICD-10-CM

## 2025-02-24 DIAGNOSIS — C56.9 MALIGNANT NEOPLASM OF OVARY, UNSPECIFIED LATERALITY (MULTI): ICD-10-CM

## 2025-02-24 DIAGNOSIS — M17.0 OSTEOARTHRITIS OF BOTH KNEES, UNSPECIFIED OSTEOARTHRITIS TYPE: ICD-10-CM

## 2025-02-24 DIAGNOSIS — E66.01 MORBID OBESITY (MULTI): ICD-10-CM

## 2025-02-24 PROCEDURE — 1036F TOBACCO NON-USER: CPT | Performed by: INTERNAL MEDICINE

## 2025-02-24 PROCEDURE — 3008F BODY MASS INDEX DOCD: CPT | Performed by: INTERNAL MEDICINE

## 2025-02-24 PROCEDURE — 93000 ELECTROCARDIOGRAM COMPLETE: CPT | Performed by: INTERNAL MEDICINE

## 2025-02-24 PROCEDURE — 99396 PREV VISIT EST AGE 40-64: CPT | Performed by: INTERNAL MEDICINE

## 2025-02-24 RX ORDER — MELOXICAM 15 MG/1
15 TABLET ORAL DAILY
Qty: 90 TABLET | Refills: 3 | Status: SHIPPED | OUTPATIENT
Start: 2025-02-24

## 2025-02-24 RX ORDER — LEVOTHYROXINE SODIUM 150 UG/1
150 TABLET ORAL DAILY
Qty: 90 TABLET | Refills: 3 | Status: SHIPPED | OUTPATIENT
Start: 2025-02-24

## 2025-02-24 NOTE — PROGRESS NOTES
Subjective   Patient ID: Irena Long is a 64 y.o. female who presents for the following    PHYSICAL 25    Assessment/Plan   OA: patient has severe left knee OA and moderate right knee OA and takes meloxicam.     bilateral knee OA s/p steroid injection lasted 4-5 months. Following with dr shen. Possible surgery in the near future.     hld: stable, on atorvastatin       hypothyroid: tsh wnl    Htn: will watch and continue to encourage walking exercise.     Hx Ovarian cancer: likely in remission been 21 years ago now as of 24. Will continue to screen ca-125      Morbid obesity: refer to clinical pharmacy    ekg wnl      positive cologaurd and then patient did do a colonoscopy on 2021 with polyps and was told 5 year follow up     mammogram done       HPI  female with hypothyroidism, osteoarthritis of the knees, seasonal allergies, TERRI/BSO ovarian cancer, cholecystectomy, , here for a        ovarian ca  survivor: patient followed with Dr Zamarripa (retired), she gets repeat CA-125     hypothyroid: patient denies any hypo or hypothyroid symptoms. patient denies any palpitations, diarrhea or constipation    OA right and left knee: feels cold in the knees and it huts. She says that she has difficulties walking at times. She says that steroid injections have historically helped.      social: patient denies any smoking, drug (occasional etoh use) or alcohol abuse     family history: dad passed MI     surgical history: s/p total hysterectomy      works in [employer]      Visit Vitals  Wt 119 kg (263 lb)   BMI 41.19 kg/m²   Smoking Status Former   BSA 2.37 m²     PHYSICAL EXAM     General appearance: Alert and in no acute distress. speech is clear and coherent  HEENT: Sclera and conjunctiva white, EOMI, uvela midline, no mouth lesions. PERRLA,  nasal turbinates are not swollen without exudate. TM's Jerome with cone of light, external ear canal with scant cerumen. No head trauma  Neck: no carotid  bruits or thyromegaly. no lymphadenopathy   Respiratory : No respiratory distress, normal respiratory rhythm and effort. Clear bilateral breath sounds. No wheezing or rhonchi.   Cardiovascular: heart rate regular, S1, S2. no murmurs. no Lower extremity edema  Skin inspection: Normal skin color and pigmentation, normal skin turgor and no visible rash, induration, or cellulitis  MSK: 5/5 strength upper and lower extremities without gait abnormalities. no loss of muscle mass   Neuro: 2-12 CN grossly intact.  no slurred speech. no lateralizing deficit  Psychiatric Orientation: Oriented to person, place, and time. no depression, homicidal or suicidal thoughts, normal affect  Abdomen: soft, none tender, none distended. no organomegaly      REVIEW OF SYSTEMS   Constitutional: not feeling tired and no fever, chills or sweats. Denies weight loss    HEENT: no earache and no sore throat. no blurred vision and or double vision. no headache  Cardiovascular: no exertional chest pain, no palpitations, no lower extremity edema and no intermittent leg claudication.   Lungs: Denies shortness of breath, exertional dyspnea, wheezing  Gastrointestinal: no change in bowel habits, no diarrhea, no nausea, no vomiting and no abdominal pain. Denies Melena, brbpr or dark stool  Musculoskeletal: no myalgias, no muscle weakness and no limb swelling.   Skin: no rashes, no change in skin color and pigmentation and no skin lumps.   Neurological: no headaches, no seizures, no numbness, no lateralizing deficits and no fainting.   Psychiatric: no depression and no anxiety.   Urine: denies polyuria, hematuria, dysuria   Endocrine: no cold intolerance, no heat intolerance      Allergies   Allergen Reactions    Percocet [Oxycodone-Acetaminophen] Hallucinations       Current Outpatient Medications   Medication Sig Dispense Refill    atorvastatin (Lipitor) 10 mg tablet Take 1 tablet (10 mg) by mouth once daily. 90 tablet 3    calcium carbonate 600 mg  calcium (1,500 mg) tablet Take 1 tablet (600 mg) by mouth once daily.      cetirizine (ZyrTEC) 10 mg tablet Take 0.5 tablets (5 mg) by mouth once daily.      cholecalciferol, vitamin D3, 10 mcg (400 unit) capsule Take 1 capsule (10 mcg) by mouth.      COQ10, LIPOSOMAL UBIQUINOL, ORAL Take 400 mg by mouth early in the morning..      glucosamine HCl 750 mg tablet Take by mouth.      levothyroxine (Synthroid, Levoxyl) 150 mcg tablet Take 1 tablet (150 mcg) by mouth once daily. 90 tablet 3    meloxicam (Mobic) 15 mg tablet Take 1 tablet (15 mg) by mouth once daily. 90 tablet 3    multivit-min-iron-FA-vit K-lut 8 mg iron-400 mcg-50 mcg tablet Take by mouth.       No current facility-administered medications for this visit.       Objective     Orders Only on 02/19/2025   Component Date Value Ref Range Status    CHOLESTEROL, TOTAL 02/19/2025 205 (H)  <200 mg/dL Final    HDL CHOLESTEROL 02/19/2025 73  > OR = 50 mg/dL Final    TRIGLYCERIDES 02/19/2025 99  <150 mg/dL Final    LDL-CHOLESTEROL 02/19/2025 111 (H)  mg/dL (calc) Final    CHOL/HDLC RATIO 02/19/2025 2.8  <5.0 (calc) Final    NON HDL CHOLESTEROL 02/19/2025 132 (H)  <130 mg/dL (calc) Final    GLUCOSE 02/19/2025 103 (H)  65 - 99 mg/dL Final    UREA NITROGEN (BUN) 02/19/2025 24  7 - 25 mg/dL Final    CREATININE 02/19/2025 0.76  0.50 - 1.05 mg/dL Final    EGFR 02/19/2025 87  > OR = 60 mL/min/1.73m2 Final    SODIUM 02/19/2025 140  135 - 146 mmol/L Final    POTASSIUM 02/19/2025 4.2  3.5 - 5.3 mmol/L Final    CHLORIDE 02/19/2025 104  98 - 110 mmol/L Final    CARBON DIOXIDE 02/19/2025 24  20 - 32 mmol/L Final    ELECTROLYTE BALANCE 02/19/2025 12  7 - 17 mmol/L (calc) Final    CALCIUM 02/19/2025 9.6  8.6 - 10.4 mg/dL Final    PROTEIN, TOTAL 02/19/2025 6.9  6.1 - 8.1 g/dL Final    ALBUMIN 02/19/2025 4.8  3.6 - 5.1 g/dL Final    BILIRUBIN, TOTAL 02/19/2025 0.7  0.2 - 1.2 mg/dL Final    ALKALINE PHOSPHATASE 02/19/2025 87  37 - 153 U/L Final    AST 02/19/2025 18  10 - 35 U/L  Final    ALT 2025 14  6 - 29 U/L Final    WHITE BLOOD CELL COUNT 2025 6.4  3.8 - 10.8 Thousand/uL Final    RED BLOOD CELL COUNT 2025 5.09  3.80 - 5.10 Million/uL Final    HEMOGLOBIN 2025 15.4  11.7 - 15.5 g/dL Final    HEMATOCRIT 2025 45.6 (H)  35.0 - 45.0 % Final    MCV 2025 89.6  80.0 - 100.0 fL Final    MCH 2025 30.3  27.0 - 33.0 pg Final    MCHC 2025 33.8  32.0 - 36.0 g/dL Final    RDW 2025 12.1  11.0 - 15.0 % Final    PLATELET COUNT 2025 236  140 - 400 Thousand/uL Final    MPV 2025 11.2  7.5 - 12.5 fL Final    TSH W/REFLEX TO FT4 2025 2.25  0.40 - 4.50 mIU/L Final    VITAMIN D,25-OH,TOTAL,IA 2025 72  30 - 100 ng/mL Final    HEMOGLOBIN A1c 2025 5.2  <5.7 % of total Hgb Final    eAG (mg/dL) 2025 103  mg/dL Final    eAG (mmol/L) 2025 5.7  mmol/L Final       Radiology: Reviewed imaging in powerchart.  No results found.    No family history on file.  Social History     Socioeconomic History    Marital status:    Tobacco Use    Smoking status: Former     Current packs/day: 0.00     Types: Cigarettes     Quit date:      Years since quittin.1    Smokeless tobacco: Never   Substance and Sexual Activity    Alcohol use: Yes     Alcohol/week: 2.0 - 3.0 standard drinks of alcohol     Types: 2 - 3 Standard drinks or equivalent per week     Comment: on holidays    Drug use: Never     Past Medical History:   Diagnosis Date    Allergy status to unspecified drugs, medicaments and biological substances     Atopy    Personal history of malignant neoplasm of ovary     History of ovarian cancer    Personal history of malignant neoplasm of ovary     H/O ovarian cancer    Personal history of other diseases of the musculoskeletal system and connective tissue     History of osteoarthritis    Personal history of other endocrine, nutritional and metabolic disease     History of hypothyroidism     Past Surgical History:    Procedure Laterality Date     SECTION, CLASSIC  2015     Section    CHOLECYSTECTOMY  2015    Cholecystectomy    HYSTERECTOMY  2015    Hysterectomy       Charting was completed using voice recognition technology and may include unintended errors.

## 2025-03-03 ENCOUNTER — APPOINTMENT (OUTPATIENT)
Dept: PHARMACY | Facility: HOSPITAL | Age: 65
End: 2025-03-03
Payer: COMMERCIAL

## 2025-03-03 DIAGNOSIS — E66.01 MORBID OBESITY (MULTI): ICD-10-CM

## 2025-03-03 NOTE — PROGRESS NOTES
Clinical Pharmacy Appointment    Patient ID: Irena Long is a 64 y.o. female who presents for Weight Management.    Pt is here for First appointment.     Referring Provider: Jaswinder Borja DO  PCP: Jaswinder Borja DO   Last visit with PCP: 2/24/25   Next visit with PCP: Not Scheduled     Subjective   HPI    Patient is a 64 y.o. female presenting to an initial clinical pharmacy visit for weight management. Baseline BMI 41.19 kg/m2. Baseline weight 263 lbs.     Weight loss goal: Knee pain, planning knee replacement surgery in the next 1-2 years, and overall 50-60 lbs    She has previously tried a weight loss medication many years ago but could not remember what it was called. [facility] could not see any meds in the chart. She has also previously tried weight watchers, immune power diet, atkins, [facility] -- she saw short term benefit with these and then weight returns       Diet:   B: Greek yogurt (80 john) granola, apple or banana or cottage cheese with peaches, breakfast sandwich, english muffin with sausage dior, coffee (2-3 cups black)   L: tossed salad, lunch meat cheese, sandwich, chips, cheese and crackers  D: Freezer foods with salad   Sn: popcorn, fruit gummy's     Exercise:   - knee limiting pain  - works from home (apple watch to remind to move) transition back to in office taking bus (30 min walking)   - Looking into bike peddler   - Outdoor pool in development    Drug Interactions  No relevant drug interactions were noted.    Medication System Management  Patient's preferred pharmacy: [id number] [place], [place]  Affordability/Accessibility: All GLP1s require PA    Objective   Allergies   Allergen Reactions    Percocet [Oxycodone-Acetaminophen] Hallucinations     Social History     Social History Narrative    Not on file      Medication Review  Current Outpatient Medications   Medication Instructions    atorvastatin (LIPITOR) 10 mg, oral, Daily    calcium carbonate  "600 mg, Daily    cetirizine (ZYRTEC) 10 mg, oral, Daily    cholecalciferol (VITAMIN D-3) 400 Units    COQ10, LIPOSOMAL UBIQUINOL, ORAL 400 mg, Daily    glucosamine HCl 750 mg tablet oral    levothyroxine (SYNTHROID, LEVOXYL) 150 mcg, oral, Daily    meloxicam (MOBIC) 15 mg, oral, Daily    multivit-min-iron-FA-vit K-lut 8 mg iron-400 mcg-50 mcg tablet 1 tablet, oral, Daily      Vitals  BP Readings from Last 2 Encounters:   02/24/25 146/84   08/12/24 159/87     BMI Readings from Last 1 Encounters:   02/24/25 41.19 kg/m²      Labs  A1C  Lab Results   Component Value Date    HGBA1C 5.2 02/19/2025    HGBA1C 4.9 02/21/2024    HGBA1C 4.9 01/25/2022     BMP  Lab Results   Component Value Date    CALCIUM 9.6 02/19/2025     02/19/2025    K 4.2 02/19/2025    CO2 24 02/19/2025     02/19/2025    BUN 24 02/19/2025    CREATININE 0.76 02/19/2025    EGFR 87 02/19/2025     LFTs  Lab Results   Component Value Date    ALT 14 02/19/2025    AST 18 02/19/2025    ALKPHOS 87 02/19/2025    BILITOT 0.7 02/19/2025     FLP  Lab Results   Component Value Date    TRIG 99 02/19/2025    CHOL 205 (H) 02/19/2025    LDLF 105 (H) 09/07/2023    LDLCALC 111 (H) 02/19/2025    HDL 73 02/19/2025     Urine Microalbumin  No results found for: \"MICROALBCREA\"  Weight Management  Wt Readings from Last 3 Encounters:   02/24/25 119 kg (263 lb)   08/12/24 118 kg (260 lb)   02/21/24 120 kg (265 lb)      There is no height or weight on file to calculate BMI.     Zepbound Education:   Counseled patient on Zepbound MOA, expectations, side effects, duration of therapy, administration, and monitoring parameters.  Provided detailed dosing and administration counseling to ensure proper technique.   Reviewed Zepbound titration schedule, starting with 2.5 mg once weekly to a goal of 15 mg once weekly if tolerated  Counseled patient on the benefits of GLP-1ra glycemic control and weight loss  Reviewed storage requirements of Zepbound when not in use, and when to " administer the medication if a dose is missed.  Advised patient that they may experience improved satiety after meals and portion sizes of meals may be reduced as doses of Zepound increase.    Assessment/Plan   Problem List Items Addressed This Visit       Morbid obesity (Multi)    Relevant Orders    Referral to Clinical Pharmacy   Will attempt PA for Zepbound   PA Key [id number]  CONTINUE making healthy diet and lifestyle choices  Provided patient     Follow up with Clinical Pharmacy Team 3/11/25 at 8:40 am     Time spent with pt: Total length of time 20 (minutes) of the encounter and more than 50% was spent counseling the patient.    Continue all meds under the continuation of care with the referring provider and clinical pharmacy team.    Please reach out to the Clinical Pharmacy Team if there are any further questions.     Verbal consent to manage patient's drug therapy was obtained from patient. They were informed they may decline to participate or withdraw from participation in pharmacy services at any time.    Laura Fernández, PharmD  Clinical Pharmacy Specialist   [phone]

## 2025-03-11 ENCOUNTER — APPOINTMENT (OUTPATIENT)
Dept: PHARMACY | Facility: HOSPITAL | Age: 65
End: 2025-03-11
Payer: COMMERCIAL

## 2025-03-11 DIAGNOSIS — E66.01 MORBID OBESITY (MULTI): ICD-10-CM

## 2025-03-11 NOTE — PROGRESS NOTES
"  Clinical Pharmacy Appointment    Patient ID: rIena Long is a 64 y.o. female who presents for Weight Management.    Pt is here for Follow Up appointment.     Referring Provider: Jaswinder Borja DO  PCP: Jaswinder Borja DO   Last visit with PCP: 2/24/25   Next visit with PCP: Not Scheduled     Irena Long has been denied for prior authorization for Zepbound     Key: [id number]  Reason for Denial: \"The use of this medication without inadequate treatment response, intolerance, or contraindication to at least TWO oral medications for weight management (e.g., benzphetamine, diethylpropion, phentermine, Qsymia, etc.)\"    Subjective   HPI    Patient is a 64 y.o. female presenting to a follow up clinical pharmacy visit for weight management. Baseline BMI 41.19 kg/m2. Baseline weight 263 lbs. Since the last visit patient has been monitoring calorie intake and increasing walking. She reported losing 1.8 lbs.     Weight loss goal: Knee pain, planning knee replacement surgery in the next 1-2 years, and overall 50-60 lbs    She has previously tried a weight loss medication many years ago but could not remember what it was called. [facility] could not see any meds in the chart. She has also previously tried weight watchers, immune power diet, atkins, [facility] -- she saw short term benefit with these and then weight returns.     Diet:   B: Greek yogurt (80 john) granola, apple or banana or cottage cheese with peaches, breakfast sandwich, english muffin with sausage dior, coffee (2-3 cups black)   L: tossed salad, lunch meat cheese, sandwich, chips, cheese and crackers  D: Freezer foods with salad   Sn: popcorn, fruit gummy's     Exercise:   - knee limiting pain  - works from home (apple watch to remind to move) transition back to in office taking bus (30 min walking)   - Looking into bike peddler   - Outdoor pool in development    Drug Interactions  No relevant drug interactions were noted.    Medication " "System Management  Patient's preferred pharmacy: [id number] [place], [place]  Affordability/Accessibility: All GLP1s require PA    Objective   Allergies   Allergen Reactions    Percocet [Oxycodone-Acetaminophen] Hallucinations     Social History     Social History Narrative    Not on file      Medication Review  Current Outpatient Medications   Medication Instructions    atorvastatin (LIPITOR) 10 mg, oral, Daily    calcium carbonate 600 mg, Daily    cetirizine (ZYRTEC) 10 mg, oral, Daily    cholecalciferol (VITAMIN D-3) 400 Units    COQ10, LIPOSOMAL UBIQUINOL, ORAL 400 mg, Daily    glucosamine HCl 750 mg tablet oral    levothyroxine (SYNTHROID, LEVOXYL) 150 mcg, oral, Daily    meloxicam (MOBIC) 15 mg, oral, Daily    multivit-min-iron-FA-vit K-lut 8 mg iron-400 mcg-50 mcg tablet 1 tablet, oral, Daily      Vitals  BP Readings from Last 2 Encounters:   02/24/25 146/84   08/12/24 159/87     BMI Readings from Last 1 Encounters:   02/24/25 41.19 kg/m²      Labs  A1C  Lab Results   Component Value Date    HGBA1C 5.2 02/19/2025    HGBA1C 4.9 02/21/2024    HGBA1C 4.9 01/25/2022     BMP  Lab Results   Component Value Date    CALCIUM 9.6 02/19/2025     02/19/2025    K 4.2 02/19/2025    CO2 24 02/19/2025     02/19/2025    BUN 24 02/19/2025    CREATININE 0.76 02/19/2025    EGFR 87 02/19/2025     LFTs  Lab Results   Component Value Date    ALT 14 02/19/2025    AST 18 02/19/2025    ALKPHOS 87 02/19/2025    BILITOT 0.7 02/19/2025     FLP  Lab Results   Component Value Date    TRIG 99 02/19/2025    CHOL 205 (H) 02/19/2025    LDLF 105 (H) 09/07/2023    LDLCALC 111 (H) 02/19/2025    HDL 73 02/19/2025     Urine Microalbumin  No results found for: \"MICROALBCREA\"  Weight Management  Wt Readings from Last 3 Encounters:   02/24/25 119 kg (263 lb)   08/12/24 118 kg (260 lb)   02/21/24 120 kg (265 lb)      There is no height or weight on file to calculate BMI.     Assessment/Plan   Problem List Items Addressed " This Visit       Morbid obesity (Multi)   PA for Zepbound denied. All GLP1's requrie PA. Discussed $650/month coupon for Zepbound pens and [employer] vials. However, these were not an affordable cost. Patient has started making lifestyle adjustments since last visit and started seeing benefit.   CONTINUE making healthy diet and lifestyle choices  Provided patient with [facility] phone number for any additional questions or concerns [phone]    Follow up with Clinical Pharmacy Team as needed by patient or PCP    Time spent with pt: Total length of time 10 (minutes) of the encounter and more than 50% was spent counseling the patient.    Continue all meds under the continuation of care with the referring provider and clinical pharmacy team.    Please reach out to the Clinical Pharmacy Team if there are any further questions.     Verbal consent to manage patient's drug therapy was obtained from patient. They were informed they may decline to participate or withdraw from participation in pharmacy services at any time.    Laura Fernández, PharmD  Clinical Pharmacy Specialist   [phone]

## 2025-07-28 ENCOUNTER — TELEPHONE (OUTPATIENT)
Dept: PRIMARY CARE | Facility: CLINIC | Age: 65
End: 2025-07-28
Payer: COMMERCIAL

## 2025-07-28 DIAGNOSIS — Z13.220 LIPID SCREENING: ICD-10-CM

## 2025-07-29 RX ORDER — ATORVASTATIN CALCIUM 10 MG/1
10 TABLET, FILM COATED ORAL DAILY
Qty: 90 TABLET | Refills: 3 | Status: SHIPPED | OUTPATIENT
Start: 2025-07-29